# Patient Record
Sex: FEMALE | Race: WHITE | URBAN - METROPOLITAN AREA
[De-identification: names, ages, dates, MRNs, and addresses within clinical notes are randomized per-mention and may not be internally consistent; named-entity substitution may affect disease eponyms.]

---

## 2017-08-28 DIAGNOSIS — Z12.31 ENCOUNTER FOR SCREENING MAMMOGRAM FOR MALIGNANT NEOPLASM OF BREAST: Primary | ICD-10-CM

## 2018-01-23 ENCOUNTER — TELEPHONE (OUTPATIENT)
Dept: OBGYN CLINIC | Age: 58
End: 2018-01-23

## 2018-01-23 NOTE — TELEPHONE ENCOUNTER
GYN patient had to take prednisone end of november and early december for poison ivy. Has been dealing with a \"yeast infection\" since December c/o white discharge with itching mostly on the outside . Wonders if something could be ordered for this . Pharmacy confirmed in [facility] .  Patient number [phone]

## 2018-07-16 DIAGNOSIS — R92.2 DENSE BREASTS: Primary | ICD-10-CM

## 2018-07-16 DIAGNOSIS — Z80.3 FAMILY HX-BREAST MALIGNANCY: ICD-10-CM

## 2018-07-19 ENCOUNTER — TELEPHONE (OUTPATIENT)
Dept: OBGYN CLINIC | Age: 58
End: 2018-07-19

## 2018-07-19 NOTE — TELEPHONE ENCOUNTER
Sushma Jamison with [address] calling with our office needing to verify information regarding breast MRI for pt. MRI appt scheduled 7/31/18  CPT code [address] number [id number]  Tracking number [id number]    I called [facility] @[phone] spoke with [place] breast MRI was approved with validation period 7/17-9/15 auth # [id number]  They will send facility a fax with information on it but I also called Sushma Jamison & left message with information